# Patient Record
Sex: FEMALE | Race: WHITE | NOT HISPANIC OR LATINO | Employment: FULL TIME | URBAN - METROPOLITAN AREA
[De-identification: names, ages, dates, MRNs, and addresses within clinical notes are randomized per-mention and may not be internally consistent; named-entity substitution may affect disease eponyms.]

---

## 2024-05-10 ENCOUNTER — LAB REQUISITION (OUTPATIENT)
Dept: LAB | Facility: CLINIC | Age: 64
End: 2024-05-10

## 2024-05-10 ENCOUNTER — TRANSFERRED RECORDS (OUTPATIENT)
Dept: HEALTH INFORMATION MANAGEMENT | Facility: CLINIC | Age: 64
End: 2024-05-10

## 2024-05-10 LAB
BASOPHILS # BLD AUTO: 0 10E3/UL (ref 0–0.2)
BASOPHILS NFR BLD AUTO: 1 %
EOSINOPHIL # BLD AUTO: 0.1 10E3/UL (ref 0–0.7)
EOSINOPHIL NFR BLD AUTO: 3 %
ERYTHROCYTE [DISTWIDTH] IN BLOOD BY AUTOMATED COUNT: 13.5 % (ref 10–15)
HCT VFR BLD AUTO: 41 % (ref 35–47)
HGB BLD-MCNC: 13.3 G/DL (ref 11.7–15.7)
IMM GRANULOCYTES # BLD: 0 10E3/UL
IMM GRANULOCYTES NFR BLD: 0 %
LYMPHOCYTES # BLD AUTO: 0.9 10E3/UL (ref 0.8–5.3)
LYMPHOCYTES NFR BLD AUTO: 25 %
MCH RBC QN AUTO: 27.9 PG (ref 26.5–33)
MCHC RBC AUTO-ENTMCNC: 32.4 G/DL (ref 31.5–36.5)
MCV RBC AUTO: 86 FL (ref 78–100)
MONOCYTES # BLD AUTO: 0.5 10E3/UL (ref 0–1.3)
MONOCYTES NFR BLD AUTO: 12 %
NEUTROPHILS # BLD AUTO: 2.2 10E3/UL (ref 1.6–8.3)
NEUTROPHILS NFR BLD AUTO: 59 %
NRBC # BLD AUTO: 0 10E3/UL
NRBC BLD AUTO-RTO: 0 /100
PLATELET # BLD AUTO: 157 10E3/UL (ref 150–450)
RBC # BLD AUTO: 4.76 10E6/UL (ref 3.8–5.2)
RETICS # AUTO: 0.07 10E6/UL (ref 0.03–0.1)
RETICS/RBC NFR AUTO: 1.4 % (ref 0.5–2)
WBC # BLD AUTO: 3.7 10E3/UL (ref 4–11)

## 2024-05-10 PROCEDURE — 85025 COMPLETE CBC W/AUTO DIFF WBC: CPT | Performed by: FAMILY MEDICINE

## 2024-05-10 PROCEDURE — 85045 AUTOMATED RETICULOCYTE COUNT: CPT | Performed by: FAMILY MEDICINE

## 2024-05-10 PROCEDURE — 85060 BLOOD SMEAR INTERPRETATION: CPT | Performed by: PATHOLOGY

## 2024-05-13 LAB
PATH REPORT.COMMENTS IMP SPEC: NORMAL
PATH REPORT.FINAL DX SPEC: NORMAL
PATH REPORT.MICROSCOPIC SPEC OTHER STN: NORMAL
PATH REPORT.MICROSCOPIC SPEC OTHER STN: NORMAL
PATH REPORT.RELEVANT HX SPEC: NORMAL

## 2024-06-14 ENCOUNTER — MEDICAL CORRESPONDENCE (OUTPATIENT)
Dept: HEALTH INFORMATION MANAGEMENT | Facility: CLINIC | Age: 64
End: 2024-06-14

## 2024-06-14 ENCOUNTER — TRANSFERRED RECORDS (OUTPATIENT)
Dept: HEALTH INFORMATION MANAGEMENT | Facility: CLINIC | Age: 64
End: 2024-06-14

## 2024-06-17 ENCOUNTER — TRANSCRIBE ORDERS (OUTPATIENT)
Dept: OTHER | Age: 64
End: 2024-06-17

## 2024-06-17 DIAGNOSIS — D80.1 HYPOGAMMAGLOBULINEMIA (H): Primary | ICD-10-CM

## 2024-06-19 ENCOUNTER — PATIENT OUTREACH (OUTPATIENT)
Dept: ONCOLOGY | Facility: CLINIC | Age: 64
End: 2024-06-19

## 2024-06-20 NOTE — TELEPHONE ENCOUNTER
"New Patient Hematology Nurse Navigator Note     Referral Date: 06.17.24    Referring provider: Self    Referring Clinic/Organization: [facility]  Self Referred     Referred to: Oncology    Requested provider (if applicable): First available - did not specify     Evaluation for : Second opinion of lymphadenopathy      Clinical History (per Nurse review of records provided):      \"imaging showed some moderate hepatosplenomegaly and some areas of adenopathy in the mesentery which is concerning for lymphoproliferative disorder \"   ([phone])  All other globins normal    Azam cancelled PET and instructed patient to re image in 3 months    Referral updates and Plan:   Will proceed to schedule for second opinion of lymphadenopathy     [facility] [place]  Hematology Nurse Navigation   [phone]    [facility] / Hematology   [phone]   [email or web address]           "

## 2024-07-17 NOTE — TELEPHONE ENCOUNTER
RECORDS STATUS - ALL OTHER DIAGNOSIS      RECORDS RECEIVED FROM: Brigitte Moser   NOTES STATUS DETAILS   OFFICE NOTE from referring provider External: Brigitte Moser 06/15/24: Qi Manjarrez CNP   MEDICATION LIST External: Brigitte Moser    LABS     PATHOLOGY REPORTS     ANYTHING RELATED TO DIAGNOSIS External: Brigitte Moser Most recent 06/15/24

## 2024-07-18 ENCOUNTER — PRE VISIT (OUTPATIENT)
Dept: ONCOLOGY | Facility: CLINIC | Age: 64
End: 2024-07-18

## 2024-07-18 ENCOUNTER — ONCOLOGY VISIT (OUTPATIENT)
Dept: ONCOLOGY | Facility: CLINIC | Age: 64
End: 2024-07-18
Attending: NURSE PRACTITIONER
Payer: COMMERCIAL

## 2024-07-18 VITALS
SYSTOLIC BLOOD PRESSURE: 108 MMHG | HEIGHT: 67 IN | OXYGEN SATURATION: 97 % | DIASTOLIC BLOOD PRESSURE: 82 MMHG | TEMPERATURE: 97.6 F | BODY MASS INDEX: 38.47 KG/M2 | WEIGHT: 245.13 LBS | HEART RATE: 94 BPM

## 2024-07-18 DIAGNOSIS — I88.0 MESENTERIC LYMPHADENITIS: Primary | ICD-10-CM

## 2024-07-18 LAB
CANCER AG125 SERPL-ACNC: 7 U/ML
CEA SERPL-MCNC: 0.8 NG/ML
TSH SERPL DL<=0.005 MIU/L-ACNC: 2.01 UIU/ML (ref 0.3–4.2)

## 2024-07-18 PROCEDURE — 82784 ASSAY IGA/IGD/IGG/IGM EACH: CPT | Performed by: INTERNAL MEDICINE

## 2024-07-18 PROCEDURE — 86304 IMMUNOASSAY TUMOR CA 125: CPT | Performed by: INTERNAL MEDICINE

## 2024-07-18 PROCEDURE — 84443 ASSAY THYROID STIM HORMONE: CPT | Performed by: INTERNAL MEDICINE

## 2024-07-18 PROCEDURE — 99000 SPECIMEN HANDLING OFFICE-LAB: CPT | Performed by: INTERNAL MEDICINE

## 2024-07-18 PROCEDURE — 36415 COLL VENOUS BLD VENIPUNCTURE: CPT | Performed by: INTERNAL MEDICINE

## 2024-07-18 PROCEDURE — 99204 OFFICE O/P NEW MOD 45 MIN: CPT | Performed by: INTERNAL MEDICINE

## 2024-07-18 PROCEDURE — 82787 IGG 1 2 3 OR 4 EACH: CPT | Performed by: INTERNAL MEDICINE

## 2024-07-18 PROCEDURE — 86301 IMMUNOASSAY TUMOR CA 19-9: CPT | Mod: 90 | Performed by: INTERNAL MEDICINE

## 2024-07-18 PROCEDURE — 82378 CARCINOEMBRYONIC ANTIGEN: CPT | Performed by: INTERNAL MEDICINE

## 2024-07-18 RX ORDER — OXYCODONE HYDROCHLORIDE 5 MG/1
5 TABLET ORAL
COMMUNITY
Start: 2024-05-11

## 2024-07-18 ASSESSMENT — PAIN SCALES - GENERAL: PAINLEVEL: SEVERE PAIN (7)

## 2024-07-18 NOTE — Clinical Note
7/18/2024      Meg Rust  [id number] [address] [address]      Dear Colleague,    Thank you for referring your patient, Meg Rust, to the [facility]. Please see a copy of my visit note below.    No notes on file    Again, thank you for allowing me to participate in the care of your patient.        Sincerely,        James Nichols MD

## 2024-07-19 LAB
IGG SERPL-MCNC: 677 MG/DL (ref 610–1616)
IGG1 SER-MCNC: 375 MG/DL (ref 382–929)
IGG2 SER-MCNC: 218 MG/DL (ref 242–700)
IGG3 SER-MCNC: 56 MG/DL (ref 22–176)
IGG4 SER-MCNC: 7 MG/DL (ref 4–86)
SUBCLASSES, PERCENT: 97 %

## 2024-07-21 LAB — CANCER AG19-9 SERPL IA-ACNC: 8 U/ML

## 2024-07-25 ENCOUNTER — TELEPHONE (OUTPATIENT)
Dept: ONCOLOGY | Facility: CLINIC | Age: 64
End: 2024-07-25
Payer: COMMERCIAL

## 2024-07-25 NOTE — TELEPHONE ENCOUNTER
[facility]/Voicemail    Clinical Data: Care Coordinator Outreach    Outreach attempted x 2.  Left message on patient's voicemail with call back information and requested return call.    Plan: Care Coordinator will try to reach patient again in 1-2 business days.    Sarah Marinelli RNCC

## 2024-07-25 NOTE — TELEPHONE ENCOUNTER
Meg returned call. She would prefer if you try her on her cell phone [phone] and it is okay to leave a message.     I have update her demographics with this number. Thank you.

## 2024-07-25 NOTE — TELEPHONE ENCOUNTER
[facility]/Voicemail    Clinical Data: Care Coordinator Outreach    Outreach attempted x 1.  Left message on patient's voicemail with call back information and requested return call.    RESULTS: results and recommendations per Dr. iNchols: Labs are reassuring.  No abnormal tumor markers.  Normal thyroid function for fatigue.  Normal IgG studies.  Follow-up as planned.    Plan: Care Coordinator will try to reach patient again in 1-2 business days.    Sarah Marinelli RNCC

## 2024-08-06 ENCOUNTER — HOSPITAL ENCOUNTER (OUTPATIENT)
Dept: CT IMAGING | Facility: CLINIC | Age: 64
Discharge: HOME | End: 2024-08-06
Attending: INTERNAL MEDICINE | Admitting: INTERNAL MEDICINE
Payer: COMMERCIAL

## 2024-08-06 DIAGNOSIS — I88.0 MESENTERIC LYMPHADENITIS: ICD-10-CM

## 2024-08-06 DIAGNOSIS — I88.0 MESENTERIC LYMPHADENITIS: Primary | ICD-10-CM

## 2024-08-06 PROCEDURE — 250N000009 HC RX 250: Performed by: INTERNAL MEDICINE

## 2024-08-06 PROCEDURE — 250N000011 HC RX IP 250 OP 636: Performed by: INTERNAL MEDICINE

## 2024-08-06 PROCEDURE — 74177 CT ABD & PELVIS W/CONTRAST: CPT

## 2024-08-06 RX ORDER — IOPAMIDOL 755 MG/ML
500 INJECTION, SOLUTION INTRAVASCULAR ONCE
Status: COMPLETED | OUTPATIENT
Start: 2024-08-06 | End: 2024-08-06

## 2024-08-06 RX ADMIN — IOPAMIDOL 100 ML: 755 INJECTION, SOLUTION INTRAVENOUS at 15:39

## 2024-08-06 RX ADMIN — SODIUM CHLORIDE 60 ML: 9 INJECTION, SOLUTION INTRAVENOUS at 15:39

## 2024-08-15 ENCOUNTER — ONCOLOGY VISIT (OUTPATIENT)
Dept: ONCOLOGY | Facility: CLINIC | Age: 64
End: 2024-08-15
Attending: INTERNAL MEDICINE
Payer: COMMERCIAL

## 2024-08-15 VITALS
WEIGHT: 246.31 LBS | BODY MASS INDEX: 38.66 KG/M2 | TEMPERATURE: 97.1 F | HEIGHT: 67 IN | DIASTOLIC BLOOD PRESSURE: 85 MMHG | OXYGEN SATURATION: 98 % | HEART RATE: 80 BPM | SYSTOLIC BLOOD PRESSURE: 130 MMHG

## 2024-08-15 DIAGNOSIS — R59.9 ADENOPATHY: Primary | ICD-10-CM

## 2024-08-15 PROCEDURE — 99213 OFFICE O/P EST LOW 20 MIN: CPT | Performed by: INTERNAL MEDICINE

## 2024-08-15 ASSESSMENT — PAIN SCALES - GENERAL: PAINLEVEL: MODERATE PAIN (4)

## 2024-08-15 NOTE — LETTER
"8/15/2024      Meg Rust  [id number] [address] [address]      Dear Colleague,    Thank you for referring your patient, Meg Rust, to the [facility] [place]. Please see a copy of my visit note below.    [facility] Hematology / Oncology  Progress Note  Name: Meg Rust  :  1960  MRN:  [id number]    --------------------    Assessment / Plan:  Estela Mesentery:    Continue observation.  Radiographically, things appear stable; low-level adenopathy persists.  Reviewed normal tumor markers, lack of solid malignancy \"primary\", normal IgG4.  Suspect this could be low-level NHL vs reactive process vs autoimmune / inflammatory process, less likely carcinomatosis.  Would likely require ex lap for anshul, mesenteric sampling.  Holding on empiric immunosuppression w/ mild, intermittent symptoms.  Planning colonoscopy; would recommend random biopsies to ensure nothing inflammatory.  Reviewed that if worsening symptoms, we can fast track our plans.  RTC 6 months w/ labs and CT AP.    James Nichols MD    --------------------    Interval History:  Meg presents for follow-up of estela mesentery.  Still w/ some low-level pelvic pressure and pain; nothing she needs to take anything for.  Bowels are okay.  No unexplained fevers, sweats.    --------------------    Family History:  Family History   Problem Relation Age of Onset     Breast Cancer Mother      Lymphoma Sister        Social History:  Social History     Tobacco Use     Smoking status: Former     Current packs/day: 0.00     Types: Cigarettes     Quit date:      Years since quittin.6     Smokeless tobacco: Never   Substance Use Topics     Alcohol use: Yes     Comment: once weekly     Drug use: Never       Medications / Allergies:  Reviewed in EMR.    --------------------    Physical Exam:  VS: /85 (BP Location: Right arm, Patient Position: Chair, Cuff Size: Adult Large)   Pulse 80   " "Temp 97.1  F (36.2  C) (Temporal)   Ht 1.689 m (5' 6.5\")   Wt 111.7 kg (246 lb 5 oz)   LMP  (LMP Unknown)   SpO2 98%   BMI 39.16 kg/m    GEN: Well appearing.    Labs / Imaging / Path:  Reviewed CBC, LDH, IgG, , CEA, ..  Reviewed CT AP w/ independent review.      Again, thank you for allowing me to participate in the care of your patient.        Sincerely,        James Nichols MD  "

## 2024-08-19 NOTE — PROGRESS NOTES
"[facility] Hematology / Oncology  Progress Note  Name: Meg Rust  :  1960  MRN:  [id number]    --------------------    Assessment / Plan:  Estela Mesentery:    Continue observation.  Radiographically, things appear stable; low-level adenopathy persists.  Reviewed normal tumor markers, lack of solid malignancy \"primary\", normal IgG4.  Suspect this could be low-level NHL vs reactive process vs autoimmune / inflammatory process, less likely carcinomatosis.  Would likely require ex lap for anshul, mesenteric sampling.  Holding on empiric immunosuppression w/ mild, intermittent symptoms.  Planning colonoscopy; would recommend random biopsies to ensure nothing inflammatory.  Reviewed that if worsening symptoms, we can fast track our plans.  RTC 6 months w/ labs and CT AP.    James Nichols MD    --------------------    Interval History:  Meg presents for follow-up of estela mesentery.  Still w/ some low-level pelvic pressure and pain; nothing she needs to take anything for.  Bowels are okay.  No unexplained fevers, sweats.    --------------------    Family History:  Family History   Problem Relation Age of Onset    Breast Cancer Mother     Lymphoma Sister        Social History:  Social History     Tobacco Use    Smoking status: Former     Current packs/day: 0.00     Types: Cigarettes     Quit date:      Years since quittin.6    Smokeless tobacco: Never   Substance Use Topics    Alcohol use: Yes     Comment: once weekly    Drug use: Never       Medications / Allergies:  Reviewed in EMR.    --------------------    Physical Exam:  VS: /85 (BP Location: Right arm, Patient Position: Chair, Cuff Size: Adult Large)   Pulse 80   Temp 97.1  F (36.2  C) (Temporal)   Ht 1.689 m (5' 6.5\")   Wt 111.7 kg (246 lb 5 oz)   LMP  (LMP Unknown)   SpO2 98%   BMI 39.16 kg/m    GEN: Well appearing.    Labs / Imaging / Path:  Reviewed CBC, LDH, IgG, , CEA, ..  Reviewed CT AP w/ " independent review.

## 2024-10-06 ENCOUNTER — HEALTH MAINTENANCE LETTER (OUTPATIENT)
Age: 64
End: 2024-10-06

## 2024-10-19 NOTE — PROGRESS NOTES
[facility] Hematology / Oncology  Progress Note  Name: Meg Rust  :  1960  MRN:  [id number]    --------------------    Assessment / Plan:  Over the course of our visit, Meg, her  and I reviewed the potential causes and culprits for mesenteric changes such as hers.  Fortunately, she has only mild symptoms at this time and we discussed conservative route of serial imaging and blood work today versus aggressive diagnostic evaluation with likely an exploratory laparotomy.  After discussion of the pros and cons of both approaches, we both agree towards a conservative approach.  We did review the differential diagnosis includes autoimmune diseases such as mesenteric panniculitis, sclerosing mesenteritis, IgG4 sclerosing disease as well as occult malignancies such as GYN, gastrointestinal as well as most likely lymphoproliferative disorders.  To get to the bottom of it, we will plan to draw a CEA, cancer antigen 19 9, cancer antigen 125 as well as an IgG subclass.  If all comes back well, we will plan on a short-term follow-up scan to document stability long-term and ultimately favor simply observing this as long as her symptoms are well-controlled and her scans suggest stability.    Orders:  Orders Placed This Encounter   Procedures    CEA    Cancer antigen 19-9        TSH with free T4 reflex    IgG Subclasses     James Nichols MD    --------------------    Interval History:  Meg presents for follow-up of mesenteric changes.  She is accompanied today by her .  She describes a low-level vague abdominal and pelvic pain that comes and goes.  It does not appear to involve bowels or bladder.  She denies any unexplained fevers, chills or sweats.  No weight loss.  No worsening abdominal pain with eating.  CT abdomen and pelvis May 2024 revealed findings indicative of a estela mesentery with mesenteric edema and hepatomegaly and a few sclerotic foci suggestive of bone islands  "given stability compared to 2013 scans.  No personal history of malignancy or autoimmunity.    --------------------    Review of Systems:  10 point ROS negative except for that above.    Past Medical / Surgical History:  Previously well.    Family History:  Family History   Problem Relation Age of Onset    Breast Cancer Mother     Lymphoma Sister        Social History:  Social History     Tobacco Use    Smoking status: Former     Current packs/day: 0.00     Types: Cigarettes     Quit date:      Years since quittin.8    Smokeless tobacco: Never   Substance Use Topics    Alcohol use: Yes     Comment: once weekly    Drug use: Never       Medications / Allergies:  Reviewed in EMR.    --------------------    Physical Exam:  VS: /82 (BP Location: Right arm, Patient Position: Sitting, Cuff Size: Adult Large)   Pulse 94   Temp 97.6  F (36.4  C) (Temporal)   Ht 1.689 m (5' 6.5\")   Wt 111.2 kg (245 lb 2 oz)   SpO2 97%   BMI 38.97 kg/m    GEN: Well appearing.  BHASKAR: No cervical, clavicular, axillary adenopathy.    Labs / Imaging / Path:  Reviewed CBC, CMP.  Reviewed CT AP w/ independent review.  "